# Patient Record
Sex: FEMALE | Race: WHITE | NOT HISPANIC OR LATINO | URBAN - METROPOLITAN AREA
[De-identification: names, ages, dates, MRNs, and addresses within clinical notes are randomized per-mention and may not be internally consistent; named-entity substitution may affect disease eponyms.]

---

## 2017-01-31 ENCOUNTER — FOLLOW UP ESTABLISHED (OUTPATIENT)
Dept: URBAN - METROPOLITAN AREA CLINIC 24 | Facility: CLINIC | Age: 72
End: 2017-01-31
Payer: MEDICARE

## 2017-01-31 DIAGNOSIS — H02.31 BLEPHAROCHALASIS OF RIGHT UPPER LID: ICD-10-CM

## 2017-01-31 DIAGNOSIS — H02.34 BLEPHAROCHALASIS OF LEFT UPPER LID: ICD-10-CM

## 2017-01-31 PROCEDURE — 11900 INJECT SKIN LESIONS </W 7: CPT | Performed by: OPHTHALMOLOGY

## 2017-01-31 PROCEDURE — 99213 OFFICE O/P EST LOW 20 MIN: CPT | Performed by: OPHTHALMOLOGY

## 2017-01-31 PROCEDURE — 92285 EXTERNAL OCULAR PHOTOGRAPHY: CPT | Performed by: OPHTHALMOLOGY

## 2018-11-27 ENCOUNTER — FOLLOW UP ESTABLISHED (OUTPATIENT)
Dept: URBAN - NONMETROPOLITAN AREA CLINIC 3 | Facility: CLINIC | Age: 73
End: 2018-11-27
Payer: MEDICARE

## 2018-11-27 DIAGNOSIS — L90.5 SCAR CONDITIONS AND FIBROSIS OF SKIN: ICD-10-CM

## 2018-11-27 PROCEDURE — 92134 CPTRZ OPH DX IMG PST SGM RTA: CPT | Performed by: OPTOMETRIST

## 2018-11-27 PROCEDURE — 92014 COMPRE OPH EXAM EST PT 1/>: CPT | Performed by: OPTOMETRIST

## 2018-11-27 ASSESSMENT — KERATOMETRY
OD: 45.00
OS: 44.63

## 2018-11-27 ASSESSMENT — VISUAL ACUITY
OD: 20/25
OS: 20/25

## 2018-11-27 ASSESSMENT — INTRAOCULAR PRESSURE
OS: 15
OD: 15

## 2019-08-19 ENCOUNTER — FOLLOW UP ESTABLISHED (OUTPATIENT)
Dept: URBAN - NONMETROPOLITAN AREA CLINIC 3 | Facility: CLINIC | Age: 74
End: 2019-08-19
Payer: MEDICARE

## 2019-08-19 PROCEDURE — 92014 COMPRE OPH EXAM EST PT 1/>: CPT | Performed by: OPTOMETRIST

## 2019-08-19 PROCEDURE — 92134 CPTRZ OPH DX IMG PST SGM RTA: CPT | Performed by: OPTOMETRIST

## 2019-08-19 ASSESSMENT — INTRAOCULAR PRESSURE
OS: 16
OD: 16

## 2019-08-19 ASSESSMENT — VISUAL ACUITY
OS: 20/40
OD: 20/30

## 2019-08-19 ASSESSMENT — KERATOMETRY
OS: 44.50
OD: 45.00

## 2020-12-17 ENCOUNTER — FOLLOW UP ESTABLISHED (OUTPATIENT)
Dept: URBAN - NONMETROPOLITAN AREA CLINIC 3 | Facility: CLINIC | Age: 75
End: 2020-12-17
Payer: MEDICARE

## 2020-12-17 DIAGNOSIS — H35.371 PUCKERING OF MACULA, RIGHT EYE: ICD-10-CM

## 2020-12-17 PROCEDURE — 92134 CPTRZ OPH DX IMG PST SGM RTA: CPT | Performed by: OPTOMETRIST

## 2020-12-17 PROCEDURE — 92014 COMPRE OPH EXAM EST PT 1/>: CPT | Performed by: OPTOMETRIST

## 2020-12-17 ASSESSMENT — INTRAOCULAR PRESSURE
OS: 11
OD: 10

## 2020-12-17 ASSESSMENT — VISUAL ACUITY
OD: 20/25
OS: 20/20

## 2021-01-12 ENCOUNTER — FOLLOW UP ESTABLISHED (OUTPATIENT)
Dept: URBAN - NONMETROPOLITAN AREA CLINIC 3 | Facility: CLINIC | Age: 76
End: 2021-01-12
Payer: MEDICARE

## 2021-01-12 PROCEDURE — 92083 EXTENDED VISUAL FIELD XM: CPT | Performed by: OPTOMETRIST

## 2021-01-12 PROCEDURE — 99213 OFFICE O/P EST LOW 20 MIN: CPT | Performed by: OPTOMETRIST

## 2021-01-12 ASSESSMENT — INTRAOCULAR PRESSURE
OS: 14
OD: 15

## 2022-02-09 ENCOUNTER — OFFICE VISIT (OUTPATIENT)
Dept: URBAN - NONMETROPOLITAN AREA CLINIC 3 | Facility: CLINIC | Age: 77
End: 2022-02-09
Payer: MEDICARE

## 2022-02-09 PROCEDURE — 99213 OFFICE O/P EST LOW 20 MIN: CPT | Performed by: OPTOMETRIST

## 2022-02-09 PROCEDURE — 92134 CPTRZ OPH DX IMG PST SGM RTA: CPT | Performed by: OPTOMETRIST

## 2022-02-09 ASSESSMENT — INTRAOCULAR PRESSURE
OS: 14
OD: 16

## 2022-02-09 ASSESSMENT — VISUAL ACUITY
OD: 20/40
OS: 20/50

## 2022-02-09 NOTE — IMPRESSION/PLAN
Impression: Combined forms of age-related cataract, bilateral Plan: Cataracts account for the patient's complaints. Discussed all risks, benefits, procedures and recovery. Patient understands changing glasses will not improve vision. Patient desires to have surgery, recommend phacoemulsification with intraocular lens.  OD, OS

## 2022-02-09 NOTE — IMPRESSION/PLAN
Impression: Puckering of macula, right eye Plan: OU: ERM, no evidence of pharmaceutical toxicity. OD: Mild foveal irregularity.

## 2022-02-09 NOTE — IMPRESSION/PLAN
Impression: Other long term (current) drug therapy Plan: No evidence of pharmaceutical toxicity. Monitor 1 year.

## 2022-03-11 ENCOUNTER — TESTING ONLY (OUTPATIENT)
Dept: URBAN - METROPOLITAN AREA CLINIC 24 | Facility: CLINIC | Age: 77
End: 2022-03-11
Payer: MEDICARE

## 2022-03-11 DIAGNOSIS — Z01.818 ENCOUNTER FOR OTHER PREPROCEDURAL EXAMINATION: Primary | ICD-10-CM

## 2022-03-11 PROCEDURE — 99203 OFFICE O/P NEW LOW 30 MIN: CPT | Performed by: PHYSICIAN ASSISTANT

## 2022-03-11 RX ORDER — METHOTREXATE SODIUM 2.5 MG/1
2.5 MG TABLET ORAL
Qty: 0 | Refills: 0 | Status: ACTIVE
Start: 2022-03-11

## 2022-03-11 RX ORDER — ALUMINUM ZIRCONIUM TRICHLOROHYDREX GLY 0.19 G/G
20 MG STICK TOPICAL BID
Qty: 0 | Refills: 0 | Status: ACTIVE
Start: 2022-03-11

## 2022-03-11 ASSESSMENT — PACHYMETRY
OS: 3.10
OD: 3.03
OD: 23.61
OS: 23.79

## 2022-03-16 ENCOUNTER — PRE-OPERATIVE VISIT (OUTPATIENT)
Dept: URBAN - METROPOLITAN AREA CLINIC 24 | Facility: CLINIC | Age: 77
End: 2022-03-16
Payer: MEDICARE

## 2022-03-16 DIAGNOSIS — H52.203 BILATERAL ASTIGMATISM: ICD-10-CM

## 2022-03-16 DIAGNOSIS — Z79.899 OTHER LONG TERM (CURRENT) DRUG THERAPY: ICD-10-CM

## 2022-03-16 DIAGNOSIS — H25.813 COMBINED FORMS OF AGE-RELATED CATARACT, BILATERAL: Primary | ICD-10-CM

## 2022-03-16 DIAGNOSIS — H04.123 DRY EYE SYNDROME OF BILATERAL LACRIMAL GLANDS: ICD-10-CM

## 2022-03-16 DIAGNOSIS — M06.9 RHEUMATOID ARTHRITIS, UNSPECIFIED: ICD-10-CM

## 2022-03-16 DIAGNOSIS — H43.811 VITREOUS DEGENERATION, RIGHT EYE: ICD-10-CM

## 2022-03-16 PROCEDURE — 99204 OFFICE O/P NEW MOD 45 MIN: CPT | Performed by: OPHTHALMOLOGY

## 2022-03-16 ASSESSMENT — INTRAOCULAR PRESSURE
OD: 14
OS: 14

## 2022-03-16 NOTE — IMPRESSION/PLAN
Impression: Puckering of macula, right eye Plan: Discussed with patient, understands this may limit vision after surgery.

## 2022-03-16 NOTE — IMPRESSION/PLAN
Impression: Combined forms of age-related cataract, bilateral Plan: PLAN:  (( AIM -0.25 OU: INJECTABLE OU (DEXYCU 1st choice), ORA OU,  NO LRI OU: irreg topos   ))  present with patient during exam. Discussed cataract diagnosis with the patient. Appropriate testing ordered for cataract diagnosis prior to Preop. Risks and benefits of surgical treatment were discussed and understood. Patient desires surgical treatment. Specialty lens options discussed and pt declines, Pt does not mind wearing glasses after sx. Patient desires to proceed with surgery OU, OD FIRST. Both eyes examined, medically necessary due to impact in activities of daily living. Patient understands the need for glasses after surgery. Discussed there is a chance of developing capsular haze after surgery, which may be corrected with laser/yag after surgery.

## 2022-03-16 NOTE — IMPRESSION/PLAN
Impression: Dry eye syndrome of bilateral lacrimal glands: H04.123. Plan: Use AT's. Discussed with patient, understands this may limit vision after surgery.

## 2022-03-16 NOTE — IMPRESSION/PLAN
Impression: Other long term (current) drug therapy Plan: hx Methotrexate. Discussed with patient, understands this may limit vision after surgery.

## 2022-03-16 NOTE — IMPRESSION/PLAN
Impression: Rheumatoid arthritis, unspecified Plan: Discussed with patient, understands this may limit vision after surgery.

## 2022-03-16 NOTE — IMPRESSION/PLAN
Impression: Vitreous degeneration, right eye Plan: Discussed signs and symptoms of retinal detachment (flashes,floaters, curtain) as precaution. Patient instructed to call or return to clinic if condition gets worse. Discussed with patient, understands this may limit vision after surgery.

## 2022-03-16 NOTE — IMPRESSION/PLAN
Impression: Bilateral astigmatism: H52.203. Plan: Irregular astigmatism- no LRI OU. Discussed with patient that will need glasses after surgery. Discussed with patient, understands this may limit vision after surgery. Also discussed unmasking of astigmatism.

## 2022-03-23 ENCOUNTER — SURGERY (OUTPATIENT)
Dept: URBAN - METROPOLITAN AREA SURGERY 12 | Facility: SURGERY | Age: 77
End: 2022-03-23
Payer: MEDICARE

## 2022-03-23 PROCEDURE — 66984 XCAPSL CTRC RMVL W/O ECP: CPT | Performed by: OPHTHALMOLOGY

## 2022-03-23 PROCEDURE — PR1CP PR1CP: CUSTOM | Performed by: OPHTHALMOLOGY

## 2022-03-24 ENCOUNTER — POST-OPERATIVE VISIT (OUTPATIENT)
Dept: URBAN - METROPOLITAN AREA CLINIC 26 | Facility: CLINIC | Age: 77
End: 2022-03-24
Payer: MEDICARE

## 2022-03-24 DIAGNOSIS — Z48.810 ENCOUNTER FOR SURGICAL AFTERCARE FOLLOWING SURGERY ON A SENSE ORGAN: Primary | ICD-10-CM

## 2022-03-24 PROCEDURE — 99024 POSTOP FOLLOW-UP VISIT: CPT | Performed by: OPTOMETRIST

## 2022-03-24 ASSESSMENT — INTRAOCULAR PRESSURE: OD: 12

## 2022-03-24 NOTE — IMPRESSION/PLAN
Impression: S/P Cataract Extraction by phacoemulsification with IOL placement/ORA OD - 1 Day. Encounter for surgical aftercare following surgery on a sense organ  Z48.810. Excellent post op course   Post operative instructions reviewed - Condition is improving - Plan: Recommend at's 6x/day OD. Refract next visit. IOP check next visit. rtc as scheduled, 03/30/2022 with Dr. Antonio Royal
36.6

## 2022-03-30 ENCOUNTER — POST-OPERATIVE VISIT (OUTPATIENT)
Dept: URBAN - NONMETROPOLITAN AREA CLINIC 3 | Facility: CLINIC | Age: 77
End: 2022-03-30

## 2022-03-30 PROCEDURE — 99024 POSTOP FOLLOW-UP VISIT: CPT | Performed by: OPTOMETRIST

## 2022-03-30 ASSESSMENT — VISUAL ACUITY
OS: 20/40
OD: 20/25

## 2022-03-30 ASSESSMENT — INTRAOCULAR PRESSURE
OD: 15
OS: 15

## 2022-03-30 NOTE — IMPRESSION/PLAN
Impression:  Encounter for surgical aftercare following surgery on a sense organ  Z48.810. Excellent post op course   Post operative instructions reviewed - Cataract OS.  Plan: discussed findings

## 2022-04-18 ENCOUNTER — POST-OPERATIVE VISIT (OUTPATIENT)
Dept: URBAN - NONMETROPOLITAN AREA CLINIC 3 | Facility: CLINIC | Age: 77
End: 2022-04-18
Payer: MEDICARE

## 2022-04-18 DIAGNOSIS — Z48.810 ENCOUNTER FOR SURGICAL AFTERCARE FOLLOWING SURGERY ON A SENSE ORGAN: Primary | ICD-10-CM

## 2022-04-18 PROCEDURE — 99024 POSTOP FOLLOW-UP VISIT: CPT | Performed by: OPTOMETRIST

## 2022-04-18 ASSESSMENT — VISUAL ACUITY
OD: 20/20
OS: 20/30

## 2022-04-18 ASSESSMENT — INTRAOCULAR PRESSURE
OS: 15
OD: 14

## 2022-04-18 NOTE — IMPRESSION/PLAN
Impression: S/P Cataract Extraction by phacoemulsification with IOL placement/ORA OD - 26 Days. Encounter for surgical aftercare following surgery on a sense organ  Z48.810. Excellent post op course   Cataract OS.  Plan: pt desires cataract surgery  OS

## 2022-04-19 ENCOUNTER — ADULT PHYSICAL (OUTPATIENT)
Dept: URBAN - METROPOLITAN AREA CLINIC 24 | Facility: CLINIC | Age: 77
End: 2022-04-19
Payer: MEDICARE

## 2022-04-19 DIAGNOSIS — Z01.818 ENCOUNTER FOR OTHER PREPROCEDURAL EXAMINATION: Primary | ICD-10-CM

## 2022-04-19 DIAGNOSIS — H25.813 COMBINED FORMS OF AGE-RELATED CATARACT, BILATERAL: ICD-10-CM

## 2022-04-19 PROCEDURE — 99213 OFFICE O/P EST LOW 20 MIN: CPT | Performed by: PHYSICIAN ASSISTANT

## 2022-05-04 ENCOUNTER — SURGERY (OUTPATIENT)
Dept: URBAN - METROPOLITAN AREA SURGERY 12 | Facility: SURGERY | Age: 77
End: 2022-05-04
Payer: MEDICARE

## 2022-05-04 DIAGNOSIS — H25.812 COMBINED FORMS OF AGE-RELATED CATARACT, LEFT EYE: Primary | ICD-10-CM

## 2022-05-04 PROCEDURE — 66984 XCAPSL CTRC RMVL W/O ECP: CPT | Performed by: OPHTHALMOLOGY

## 2022-05-04 PROCEDURE — PR1CP PR1CP: CUSTOM | Performed by: OPHTHALMOLOGY

## 2022-05-05 ENCOUNTER — POST-OPERATIVE VISIT (OUTPATIENT)
Dept: URBAN - METROPOLITAN AREA CLINIC 26 | Facility: CLINIC | Age: 77
End: 2022-05-05
Payer: MEDICARE

## 2022-05-05 DIAGNOSIS — Z96.1 PRESENCE OF INTRAOCULAR LENS: Primary | ICD-10-CM

## 2022-05-05 PROCEDURE — 99024 POSTOP FOLLOW-UP VISIT: CPT | Performed by: OPTOMETRIST

## 2022-05-05 ASSESSMENT — INTRAOCULAR PRESSURE: OS: 17

## 2022-05-05 NOTE — IMPRESSION/PLAN
Impression:  Presence of intraocular lens  Z96.1. Excellent post op course   Post operative instructions reviewed - Condition is improving - Plan: rtc as scheduled with Dr. Margareth Callaway.

## 2022-05-11 ENCOUNTER — POST-OPERATIVE VISIT (OUTPATIENT)
Dept: URBAN - NONMETROPOLITAN AREA CLINIC 3 | Facility: CLINIC | Age: 77
End: 2022-05-11
Payer: MEDICARE

## 2022-05-11 PROCEDURE — 99024 POSTOP FOLLOW-UP VISIT: CPT | Performed by: OPTOMETRIST

## 2022-05-11 ASSESSMENT — VISUAL ACUITY
OD: 20/20
OS: 20/20

## 2022-05-11 ASSESSMENT — INTRAOCULAR PRESSURE
OS: 16
OD: 16

## 2022-05-11 NOTE — IMPRESSION/PLAN
Impression:  Presence of intraocular lens  Z96.1.  Excellent post op course   Post operative instructions reviewed - Plan: advise OTC for near until final refraction

## 2022-06-01 ENCOUNTER — POST-OPERATIVE VISIT (OUTPATIENT)
Dept: URBAN - NONMETROPOLITAN AREA CLINIC 3 | Facility: CLINIC | Age: 77
End: 2022-06-01
Payer: MEDICARE

## 2022-06-01 DIAGNOSIS — Z96.1 PRESENCE OF INTRAOCULAR LENS: ICD-10-CM

## 2022-06-01 DIAGNOSIS — H52.223 REGULAR ASTIGMATISM, BILATERAL: Primary | ICD-10-CM

## 2022-06-01 PROCEDURE — 99024 POSTOP FOLLOW-UP VISIT: CPT | Performed by: OPTOMETRIST

## 2022-06-01 ASSESSMENT — VISUAL ACUITY
OD: 20/25
OS: 20/20

## 2022-06-01 ASSESSMENT — INTRAOCULAR PRESSURE
OD: 15
OS: 15

## 2022-06-01 NOTE — IMPRESSION/PLAN
Impression:  Presence of intraocular lens  Z96.1.  Excellent post op course Plan: release  POST  OP eyeglass RX

## 2022-07-27 ENCOUNTER — POST-OPERATIVE VISIT (OUTPATIENT)
Dept: URBAN - NONMETROPOLITAN AREA CLINIC 3 | Facility: CLINIC | Age: 77
End: 2022-07-27
Payer: MEDICARE

## 2022-07-27 DIAGNOSIS — Z96.1 PRESENCE OF INTRAOCULAR LENS: Primary | ICD-10-CM

## 2022-07-27 PROCEDURE — 99024 POSTOP FOLLOW-UP VISIT: CPT | Performed by: OPTOMETRIST

## 2022-07-27 NOTE — IMPRESSION/PLAN
Impression: S/P ORA/Cataract Extraction by phacoemulsification with IOL placement OS - 84 Days. Presence of intraocular lens  Z96.1.  Excellent post op course Plan: discussed findings

## 2024-02-08 ENCOUNTER — OFFICE VISIT (OUTPATIENT)
Dept: URBAN - NONMETROPOLITAN AREA CLINIC 3 | Facility: CLINIC | Age: 79
End: 2024-02-08
Payer: MEDICARE

## 2024-02-08 DIAGNOSIS — Z96.1 PRESENCE OF INTRAOCULAR LENS: ICD-10-CM

## 2024-02-08 DIAGNOSIS — H04.123 DRY EYE SYNDROME OF BILATERAL LACRIMAL GLANDS: ICD-10-CM

## 2024-02-08 DIAGNOSIS — H43.813 VITREOUS DEGENERATION, BILATERAL: Primary | ICD-10-CM

## 2024-02-08 PROCEDURE — 99213 OFFICE O/P EST LOW 20 MIN: CPT

## 2024-02-08 ASSESSMENT — INTRAOCULAR PRESSURE
OS: 15
OD: 16

## 2025-02-13 ENCOUNTER — OFFICE VISIT (OUTPATIENT)
Dept: URBAN - NONMETROPOLITAN AREA CLINIC 3 | Facility: CLINIC | Age: 80
End: 2025-02-13
Payer: MEDICARE

## 2025-02-13 DIAGNOSIS — H43.813 VITREOUS DEGENERATION, BILATERAL: ICD-10-CM

## 2025-02-13 DIAGNOSIS — H52.4 PRESBYOPIA: ICD-10-CM

## 2025-02-13 DIAGNOSIS — H04.123 DRY EYE SYNDROME OF BILATERAL LACRIMAL GLANDS: Primary | ICD-10-CM

## 2025-02-13 PROCEDURE — 92014 COMPRE OPH EXAM EST PT 1/>: CPT

## 2025-02-13 ASSESSMENT — INTRAOCULAR PRESSURE
OS: 15
OD: 15

## 2025-02-13 ASSESSMENT — VISUAL ACUITY
OD: 20/20
OS: 20/20